# Patient Record
(demographics unavailable — no encounter records)

---

## 2024-10-31 NOTE — PHYSICAL EXAM
[FreeTextEntry1] : Gait: Presents ambulating independently without signs of antalgia.  Good coordination and balance noted. GENERAL: alert, cooperative, in NAD SKIN: The skin is intact, warm, pink and dry over the area examined. EYES: Normal conjunctiva, normal eyelids and pupils were equal and round. ENT: normal ears, normal nose and normal lips. CARDIOVASCULAR: brisk capillary refill, but no peripheral edema. RESPIRATORY: The patient is in no apparent respiratory distress. They're taking full deep breaths without use of accessory muscles or evidence of audible wheezes or stridor without the use of a stethoscope. Normal respiratory effort. ABDOMEN: not examined  Focused exam right ankle  Skin is intact and there is no breakdown or abrasion Soft tissue swelling lateral aspect  Full ankle range of motion with mild discomfort There is tenderness about the ATFL Brisk capillary refill distally NV intact

## 2024-10-31 NOTE — END OF VISIT
[FreeTextEntry3] :     Saw and examined patient; the above is an accurate documentation of my words and actions.   Vivian Campos MD [facility] Pediatric Orthopedic Surgery    [Time Spent: ___ minutes] : I have spent [unfilled] minutes of time on the encounter which excludes teaching and separately reported services.

## 2024-10-31 NOTE — ASSESSMENT
[FreeTextEntry1] : Samira is a 17Y female with right ATFL sprain Today's visit included obtaining history from the parent due to the child's age, the child could not be considered a reliable historian, requiring parent to act as independent historian  Clinical findings and imaging discussed at length with mother and patient. XRs right ankle performed today reviewed revealing no acute fracture. Recommendation at this time would be physical therapy to work on ankle strength and pain relief. Avoid gym, sports and recess for at least 6 weeks. School note provided. After 6 weeks. she can gradually return to activities. All questions answered. Family and patient verbalize understanding of the plan.   ITiffanie PA-C have acted as scribe and documented the above for Dr. Campos

## 2024-10-31 NOTE — HISTORY OF PRESENT ILLNESS
[FreeTextEntry1] : Samira is a 17Y female who presents with her mother for evaluation of right ankle pain for the past 6 months. She injured her ankle on 4/22/24 after falling off the bed. She was seen at [facility] where she had XRs done which were unremarkable for the fracture. She reports pain localized to the lateral aspect of the ankle. Denies any swelling. The pain is worse with long distance walking. Denies any need for pain medication at home. Denies any radiating pain, numbness or any tingling sensation. Here for orthopedic evaluation and management.   The patient's HPI was reviewed thoroughly with patient and parent. The patient's parent has acted as an independent historian regarding the above information due to the unreliable nature of the history obtained from the patient.

## 2024-10-31 NOTE — END OF VISIT
[FreeTextEntry3] :     Saw and examined patient; the above is an accurate documentation of my words and actions.   Vivian Campos MD [facility] Pediatric Orthopedic Surgery    [Time Spent: ___ minutes] : I have spent [unfilled] minutes of time on the encounter which excludes teaching and separately reported services.

## 2024-10-31 NOTE — HISTORY OF PRESENT ILLNESS
[FreeTextEntry1] : Samira is a 17Y female who presents with her mother for evaluation of right ankle pain for the past 6 months. She injured her ankle on 4/22/24 after falling off the bed. She was seen at [facility] where she had XRs done which were unremarkable for the fracture. She reports pain localized to the lateral aspect of the ankle. Denies any swelling. The pain is worse with long distance walking. Denies any need for pain medication at home. Denies any radiating pain, numbness or any tingling sensation. Here for orthopedic evaluation and management.   The patient's HPI was reviewed thoroughly with patient and parent. The patient's parent has acted as an independent historian regarding the above information due to the unreliable nature of the history obtained from the patient.